# Patient Record
Sex: MALE | Race: WHITE | NOT HISPANIC OR LATINO | URBAN - METROPOLITAN AREA
[De-identification: names, ages, dates, MRNs, and addresses within clinical notes are randomized per-mention and may not be internally consistent; named-entity substitution may affect disease eponyms.]

---

## 2021-08-24 ENCOUNTER — IMPORTED ENCOUNTER (OUTPATIENT)
Dept: URBAN - METROPOLITAN AREA CLINIC 9 | Facility: CLINIC | Age: 71
End: 2021-08-24

## 2021-08-24 PROBLEM — H40.011: Noted: 2021-08-24

## 2021-08-24 PROBLEM — Z96.1: Noted: 2021-08-24

## 2021-08-24 PROBLEM — H40.1121: Noted: 2021-08-24

## 2021-10-18 ASSESSMENT — TONOMETRY
OD_IOP_MMHG: 18
OS_IOP_MMHG: 17

## 2021-10-18 ASSESSMENT — VISUAL ACUITY
OS_CC: 20/30 SN
OD_CC: 20/40 SN

## 2021-10-18 ASSESSMENT — PACHYMETRY
OS_CT_UM: 612.0
OD_CT_UM: 596.0

## 2022-06-07 NOTE — PATIENT DISCUSSION
Pt. has to return to work no dilation today, dis peep hole vs. door open view that sight threatening disease could be missed, rto dfex.

## 2022-09-23 ENCOUNTER — FOLLOW UP (OUTPATIENT)
Dept: URBAN - METROPOLITAN AREA CLINIC 15 | Facility: CLINIC | Age: 72
End: 2022-09-23

## 2022-09-23 DIAGNOSIS — H40.011: ICD-10-CM

## 2022-09-23 DIAGNOSIS — Z96.1: ICD-10-CM

## 2022-09-23 DIAGNOSIS — H40.1121: ICD-10-CM

## 2022-09-23 PROCEDURE — 92083 EXTENDED VISUAL FIELD XM: CPT

## 2022-09-23 PROCEDURE — 92014 COMPRE OPH EXAM EST PT 1/>: CPT

## 2022-09-23 ASSESSMENT — VISUAL ACUITY
OD_PH: 20/40
OS_SC: 20/40
OD_CC: 20/50-2
OD_SC: 20/60-1
OS_CC: 20/40

## 2022-09-23 ASSESSMENT — TONOMETRY
OD_IOP_MMHG: 17
OS_IOP_MMHG: 19